# Patient Record
Sex: MALE | URBAN - METROPOLITAN AREA
[De-identification: names, ages, dates, MRNs, and addresses within clinical notes are randomized per-mention and may not be internally consistent; named-entity substitution may affect disease eponyms.]

---

## 2018-09-05 ENCOUNTER — OFFICE VISIT (OUTPATIENT)
Dept: ORTHOPEDIC SURGERY | Age: 38
End: 2018-09-05

## 2018-09-05 VITALS
RESPIRATION RATE: 18 BRPM | BODY MASS INDEX: 27.67 KG/M2 | HEART RATE: 70 BPM | OXYGEN SATURATION: 99 % | DIASTOLIC BLOOD PRESSURE: 83 MMHG | TEMPERATURE: 98.6 F | SYSTOLIC BLOOD PRESSURE: 152 MMHG | WEIGHT: 204 LBS

## 2018-09-05 DIAGNOSIS — M51.36 DDD (DEGENERATIVE DISC DISEASE), LUMBAR: Primary | ICD-10-CM

## 2018-09-05 DIAGNOSIS — R20.0 NUMBNESS OF TOES: ICD-10-CM

## 2018-09-05 NOTE — PROGRESS NOTES
[address], [address]  [place]  Phone: [phone]  Fax: [phone]        Ariel Petersen  : 1980  PCP: None      NEW PATIENT      ASSESSMENT AND PLAN     Diagnoses and all orders for this visit:    1. DDD (degenerative disc disease), lumbar  -     [88049] LS Spine 4V    2. Numbness of toes       1. Advised to stay active as tolerated. 2. Discussed DDD, core exercises, diet, injections, and surgery as last resort  3. If progression of peripheral neuropathy symptoms, would recommend lab work. 4. Given low back exercises    Follow-up Disposition:  Return if symptoms worsen or fail to improve. CHIEF COMPLAINT  Mitchell Recinos is seen today in consultation at the request of [facility] for complaints of back and BLE pain. HISTORY OF PRESENT ILLNESS  Mitchell Recinos is a 34 y.o. male. Today pt c/o back and BLE pain of 2 year duration. Pt denies any specific incident or injury that caused their pain. Pt states he received imaging upon initial pain 2 years ago which showed a cyst in L testicle. He went to urologist 2 years ago who performed surgery to remove the cyst which relieved all his pain. Pt states later his pain in his low back and L sciatica returned. Pt states about a month ago after spending a day painting the house, he noticed his toes tingling L>R. This has continued intermittently. Pt reports new nocturia. Pt states his pain with worse with laying down and is eased by activity during work. He states he usually finds relief while laying by laying curled on his side, but some night he finds no relief. Pt states he is running consistently in addition to being active at work. He however has not returned to yoga due to time constraints. Pt states prolonged periods of sitting elicits back pain. Pt states crossing legs while sitting helps to relieve pain. Pt states his back pain > BLE pain. Pt reports his legs are unequal in length and has relief from an insole. Pt denies issues with BUE or neck. Pt reports seeing chiropractor previously with benefit from neck pain and migraines. Location of pain: low back, daily  Does pain radiate into extremities: LLE to knee, RLE to buttock, toes tingling off and on  Does patient have weakness: no   Pt denies saddle paresthesias. Medications pt is on: Motrin with some benefit. Pt denies recent ED visits or hospitalizations. Denies persistent fevers, chills, weight changes, neurogenic bowel or bladder symptoms. Treatments patient has tried:  Physical therapy:Yes  Non-opioid medications: Yes, avoids meds  Spinal injections: No  Spinal surgery- No.        reviewed. PMHx of R knee surgery. Pt works in aviation. Pt admits family history of sciatica. Pain Assessment  9/5/2018   Location of Pain Back; Toe   Location Modifiers Left;Right   Severity of Pain 2   Quality of Pain (No Data)   Quality of Pain Comment stiffness, soreness, numbness, tingling   Frequency of Pain Constant   Aggravating Factors (No Data)   Aggravating Factors Comment pain is just there   Relieving Factors NSAID       MRI lumbar spine 2016  Impression:  1. L5-S1 small central and left paracentral protrusion with mild lateral recess narrowing. 2. L4-5 broad-based disc herniation and small left foraminal protrusion without significant stenosis. PAST MEDICAL HISTORY   Past Medical History:   Diagnosis Date    Testicular pain        Past Surgical History:   Procedure Laterality Date    HX TONSIL AND ADENOIDECTOMY      KNEE SCOPE, ALLOGRAFT IMPANT         MEDICATIONS    Current Outpatient Prescriptions   Medication Sig Dispense Refill    ibuprofen 200 mg cap Take  by mouth.          ALLERGIES  Allergies   Allergen Reactions    Nasonex [Mometasone] Other (comments)          SOCIAL HISTORY    Social History     Social History    Marital status:      Spouse name: N/A   Annika Anand Number of children: N/A    Years of education: N/A     Occupational History    Not on file. Social History Main Topics    Smoking status: Current Every Day Smoker     Packs/day: 0.50    Smokeless tobacco: Never Used    Alcohol use No    Drug use: Not on file    Sexual activity: Not on file     Other Topics Concern    Not on file     Social History Narrative       FAMILY HISTORY  No family history on file. REVIEW OF SYSTEMS  Review of Systems   Constitutional: Negative for chills, fever and weight loss. Respiratory: Negative for shortness of breath. Cardiovascular: Negative for chest pain. Gastrointestinal: Negative for constipation. Negative for fecal incontinence   Genitourinary: Negative for dysuria. Negative for urinary incontinence   Musculoskeletal:        Per HPI   Skin: Negative for rash. Neurological: Positive for tingling. Negative for dizziness, tremors, focal weakness and headaches. Endo/Heme/Allergies: Does not bruise/bleed easily. Psychiatric/Behavioral: The patient does not have insomnia. PHYSICAL EXAMINATION  Visit Vitals    /83 (BP 1 Location: Left arm, BP Patient Position: Sitting)    Pulse 70    Temp 98.6 °F (37 °C) (Oral)    Resp 18    Wt 204 lb (92.5 kg)    SpO2 99%    BMI 27.67 kg/m2          Accompanied by self. Constitutional:  Well developed, well nourished, in no acute distress. Psychiatric: Affect and mood are appropriate. Integumentary: No rashes or abrasions noted on exposed areas. Cardiovascular/Peripheral Vascular: Intact l pulses. No peripheral edema is noted. Lymphatic:  No evidence of lymphedema. No cervical lymphadenopathy. SPINE/MUSCULOSKELETAL EXAM      Lumbar spine:  No rash, ecchymosis, or gross obliquity. No fasciculations. No focal atrophy is noted. Excellent flexion. Increased pain with extension and lateral bending. Tenderness to palpation L5-S1. No tenderness to palpation at the sciatic notch. SI joints non-tender. Trochanters non tender. Sensation grossly intact to light touch. MOTOR:     Hip Flex Quads Hamstrings Ankle DF EHL Ankle PF   Right 5/5 5/5 5/5 5/5 5/5 5/5   Left 5/5 5/5 5/5 5/5 5/5 5/5        Straight Leg raise negative. No difficulty with tandem gait. Heel walk elicits back pain. Single Toe rise intact. Squat elicits back pain when rising. Ambulation without assistive device. FWB. RADIOGRAPHS  Lumbar spine xray films reviewed:  1) Degenerative changes at L5-S1  2) no instability    Written by Jason Silverio, as dictated by Marco A Valdez MD.    I, Dr. Marco A Valdez MD, confirm that all documentation is accurate. Mr. Mathew Corea may have a reminder for a \"due or due soon\" health maintenance. I have asked that he contact his primary care provider for follow-up on this health maintenance.

## 2018-09-05 NOTE — MR AVS SNAPSHOT
[address] [facility] [address] [place] [address] 
[phone] Patient: Shreya Magallon MRN: [id number] WIE:[id number] Visit Information Date & Time Provider Department Dept. Phone Encounter #  
 9/5/2018  9:30 AM Lisa Cortes MD [facility] [phone] [id number] Follow-up Instructions Return if symptoms worsen or fail to improve. Upcoming Health Maintenance Date Due DTaP/Tdap/Td series (1 - Tdap) 4/29/1999 Influenza Age 5 to Adult 8/1/2018 Allergies as of 9/5/2018  Review Complete On: 9/5/2018 By: Nevaeh Spann Severity Noted Reaction Type Reactions Nasonex [Mometasone]  10/05/2016    Other (comments) Current Immunizations  Never Reviewed No immunizations on file. Not reviewed this visit You Were Diagnosed With   
  
 Codes Comments DDD (degenerative disc disease), lumbar    -  Primary ICD-10-CM: M51.36 
ICD-9-CM: 722.52 Nonspecific pain in the lumbar region     ICD-10-CM: M54.5 ICD-9-CM: 724.2 Vitals BP Pulse Temp Resp Weight(growth percentile) SpO2  
 152/83 (BP 1 Location: Left arm, BP Patient Position: Sitting) 70 98.6 °F (37 °C) (Oral) 18 204 lb (92.5 kg) 99% BMI Smoking Status 27.67 kg/m2 Current Every Day Smoker BMI and BSA Data Body Mass Index Body Surface Area  
 27.67 kg/m 2 2.17 m 2 Preferred Pharmacy Pharmacy Name Phone [phone] Your Updated Medication List  
  
   
This list is accurate as of 9/5/18 10:35 AM.  Always use your most recent med list.  
  
  
  
  
 ibuprofen 200 mg Cap Take  by mouth. We Performed the Following AMB POC XRAY, SPINE, LUMBOSACRAL; 4+ [id number] CPT(R)] Follow-up Instructions Return if symptoms worsen or fail to improve. Patient Instructions Learning About Low Back Pain What is low back pain? Low back pain is pain that can occur anywhere below the ribs and above the legs. It is very common. Almost everyone has it at one time or another. Low back pain can be: 
Acute. This is new pain that can last a few days to a few weeks-at the most a few months. Chronic. This pain can last for more than a few months. Sometimes it can last for years. What are some myths about low back pain? Here are some common myths about low back pain-and the facts: Myth: \"I need to rest my back when I have back pain. \" 
Fact: Staying active won't hurt you. It may help you get better faster. Myth: \"I need prescription pain medicine. \" 
Fact: It's best to try to let time and being active heal your back. Opioid pain medicines-such as hydrocodone or oxycodone-usually don't work any better than over-the-counter medicines like ibuprofen or naproxen. And opioids can cause serious problems like addiction or overdose. Myth: \"I need a test like an X-ray or an MRI to diagnose my low back pain. \" 
Fact: Getting a test right away won't help you get better faster. And it could lead you down a treatment path you may not need, since most people get better on their own. What causes low back pain? In most cases, there isn't a clear cause. This can be frustrating, because your back hurts and there's no obvious reason. Your back pain can be caused by: 
Overuse or muscle strain. This can happen from playing sports, lifting heavy things, or not being physically fit. A herniated disc. This is a problem with the cushion between the bones in your back. Arthritis. With age, you may have changes in your bones that can narrow the space around your nerves. Other causes. In rare cases, the cause is a serious illness like an infection or cancer. But there are usually other symptoms too. What are the symptoms? Your symptoms depend on your body and the cause of your back pain. You may feel: 
· Pain that's sharp or dull. It may be in one small area or over a broad area. But even bad pain doesn't mean that it's caused by something serious. · Leg pain, numbness, or tingling. When a nerve gets squeezed-such as from a disc problem or arthritis-you may have symptoms in your leg or foot. You can even have leg symptoms from a back problem without having any pain in your back. How is low back pain diagnosed? A physical exam is the main way to diagnose low back pain. Your doctor may examine your back, check your nerves by testing your reflexes, and make sure that your muscles are strong. He or she also will ask questions about your back and overall health. Most people don't need any tests right away. Tests often don't show the reason for your pain. If your pain lasts more than 6 weeks or you have symptoms that your doctor is more concerned about, he or she may order tests. These may include an X-ray, a CT scan, or an MRI. In some cases, tests can help your doctor find a cause for your pain, especially for pain in one or both legs. How is low back pain treated? Most acute low back pain gets better on its own within a few weeks, no matter what the cause. Time and doing usual activities are all that most people need to feel better. Using heat or ice and taking over-the-counter pain medicine also can help while your body heals. If you aren't getting better on your own or your pain is very bad, your doctor may recommend: 
· Physical therapy. · Spinal manipulation, such as by a chiropractor. · Acupuncture. · Massage. · Injections of steroid medicine in your back (especially for pain that involves your legs). If you have chronic low back pain, treatment will help you understand and manage your pain. Treatment may include: 
· Staying active. This may include walking or doing back exercises. · Physical therapy. · Medicines. Some of these medicines are also used for other problems, like depression. · Pain management. Your doctor may have you see a pain specialist. 
· Counseling. Having chronic pain can be hard. It may help to talk to someone who can help you cope with your pain. Surgery isn't needed for most people. But it may help some types of low back pain. Follow-up care is a key part of your treatment and safety. Be sure to make and go to all appointments, and call your doctor if you are having problems. It's also a good idea to know your test results and keep a list of the medicines you take. When should you call for help? Call 911 anytime you think you may need emergency care. For example, call if: 
· You can't move a leg at all. Call your doctor now or seek immediate medical care if: 
· You have new or worse symptoms in your legs, belly, or buttocks. Symptoms may include: ¨ Numbness or tingling. ¨ Weakness. ¨ Pain. · You lose bladder or bowel control. Watch closely for changes in your health, and be sure to contact your doctor if: · Along with the back pain, you have a fever, lose weight, or don't feel well. · You do not get better as expected. Where can you learn more? Go to http://madeline-harriet.info/. Enter A007 in the search box to learn more about \"Learning About Low Back Pain. \" Current as of: November 29, 2017 Content Version: 11.7 © 7365-4253 [employer]. Care instructions adapted under license by [employer] (which disclaims liability or warranty for this information). If you have questions about a medical condition or this instruction, always ask your healthcare professional. [place] [address] any warranty or liability for your use of this information. Introducing [facility]!    
 [facility] [employer] introduces [employer] patient portal. Now you can access parts of your medical record, email your doctor's office, and request medication refills online. 1. In your internet browser, go to [email or web address] 2. Click on the First Time User? Click Here link in the Sign In box. You will see the New Member Sign Up page. 3. Enter your [employer] Access Code exactly as it appears below. You will not need to use this code after youve completed the sign-up process. If you do not sign up before the expiration date, you must request a new code. · [employer] Access Code: [email or web address] Expires: 12/4/2018  9:02 AM 
 
4. Enter the last four digits of your Social Security Number (xxxx) and Date of Birth (mm/dd/yyyy) as indicated and click Submit. You will be taken to the next sign-up page. 5. Create a [employer] ID. This will be your [employer] login ID and cannot be changed, so think of one that is secure and easy to remember. 6. Create a [employer] password. You can change your password at any time. 7. Enter your Password Reset Question and Answer. This can be used at a later time if you forget your password. 8. Enter your e-mail address. You will receive e-mail notification when new information is available in [address] 9. Click Sign Up. You can now view and download portions of your medical record. 10. Click the Download Summary menu link to download a portable copy of your medical information. If you have questions, please visit the Frequently Asked Questions section of the [employer] website. Remember, [employer] is NOT to be used for urgent needs. For medical emergencies, dial 911. Now available from your iPhone and Android! Please provide this summary of care documentation to your next provider. Your primary care clinician is listed as NONE. If you have any questions after today's visit, please call [phone].

## 2018-09-05 NOTE — PATIENT INSTRUCTIONS
Learning About Low Back Pain  What is low back pain? Low back pain is pain that can occur anywhere below the ribs and above the legs. It is very common. Almost everyone has it at one time or another. Low back pain can be:  Acute. This is new pain that can last a few days to a few weeks-at the most a few months. Chronic. This pain can last for more than a few months. Sometimes it can last for years. What are some myths about low back pain? Here are some common myths about low back pain-and the facts:  Myth: \"I need to rest my back when I have back pain. \"  Fact: Staying active won't hurt you. It may help you get better faster. Myth: \"I need prescription pain medicine. \"  Fact: It's best to try to let time and being active heal your back. Opioid pain medicines-such as hydrocodone or oxycodone-usually don't work any better than over-the-counter medicines like ibuprofen or naproxen. And opioids can cause serious problems like addiction or overdose. Myth: \"I need a test like an X-ray or an MRI to diagnose my low back pain. \"  Fact: Getting a test right away won't help you get better faster. And it could lead you down a treatment path you may not need, since most people get better on their own. What causes low back pain? In most cases, there isn't a clear cause. This can be frustrating, because your back hurts and there's no obvious reason. Your back pain can be caused by:  Overuse or muscle strain. This can happen from playing sports, lifting heavy things, or not being physically fit. A herniated disc. This is a problem with the cushion between the bones in your back. Arthritis. With age, you may have changes in your bones that can narrow the space around your nerves. Other causes. In rare cases, the cause is a serious illness like an infection or cancer. But there are usually other symptoms too. What are the symptoms? Your symptoms depend on your body and the cause of your back pain.  You may feel:  · Pain that's sharp or dull. It may be in one small area or over a broad area. But even bad pain doesn't mean that it's caused by something serious. · Leg pain, numbness, or tingling. When a nerve gets squeezed-such as from a disc problem or arthritis-you may have symptoms in your leg or foot. You can even have leg symptoms from a back problem without having any pain in your back. How is low back pain diagnosed? A physical exam is the main way to diagnose low back pain. Your doctor may examine your back, check your nerves by testing your reflexes, and make sure that your muscles are strong. He or she also will ask questions about your back and overall health. Most people don't need any tests right away. Tests often don't show the reason for your pain. If your pain lasts more than 6 weeks or you have symptoms that your doctor is more concerned about, he or she may order tests. These may include an X-ray, a CT scan, or an MRI. In some cases, tests can help your doctor find a cause for your pain, especially for pain in one or both legs. How is low back pain treated? Most acute low back pain gets better on its own within a few weeks, no matter what the cause. Time and doing usual activities are all that most people need to feel better. Using heat or ice and taking over-the-counter pain medicine also can help while your body heals. If you aren't getting better on your own or your pain is very bad, your doctor may recommend:  · Physical therapy. · Spinal manipulation, such as by a chiropractor. · Acupuncture. · Massage. · Injections of steroid medicine in your back (especially for pain that involves your legs). If you have chronic low back pain, treatment will help you understand and manage your pain. Treatment may include:  · Staying active. This may include walking or doing back exercises. · Physical therapy. · Medicines.  Some of these medicines are also used for other problems, like depression. · Pain management. Your doctor may have you see a pain specialist.  · Counseling. Having chronic pain can be hard. It may help to talk to someone who can help you cope with your pain. Surgery isn't needed for most people. But it may help some types of low back pain. Follow-up care is a key part of your treatment and safety. Be sure to make and go to all appointments, and call your doctor if you are having problems. It's also a good idea to know your test results and keep a list of the medicines you take. When should you call for help? Call 911 anytime you think you may need emergency care. For example, call if:  · You can't move a leg at all. Call your doctor now or seek immediate medical care if:  · You have new or worse symptoms in your legs, belly, or buttocks. Symptoms may include:  ¨ Numbness or tingling. ¨ Weakness. ¨ Pain. · You lose bladder or bowel control. Watch closely for changes in your health, and be sure to contact your doctor if:  · Along with the back pain, you have a fever, lose weight, or don't feel well. · You do not get better as expected. Where can you learn more? Go to http://madeline-harriet.info/. Enter A007 in the search box to learn more about \"Learning About Low Back Pain. \"  Current as of: November 29, 2017  Content Version: 11.7  © 9664-6747 Healthwise, Incorporated. Care instructions adapted under license by [employer] (which disclaims liability or warranty for this information). If you have questions about a medical condition or this instruction, always ask your healthcare professional. [place] [address] any warranty or liability for your use of this information. Learning About Degenerative Disc Disease  What is degenerative disc disease? Degenerative disc disease is not really a disease. It's a term used to describe the normal changes in your spinal discs as you age.  Spinal discs are small, spongy discs that separate the bones (vertebrae) that make up the spine. The discs act as shock absorbers for the spine, so it can flex, bend, and twist.  Degenerative disc disease can take place in one or more places along the spine. It most often occurs in the discs in the lower back and the neck. What causes it? As we age, our spinal discs break down, or degenerate. This breakdown causes the symptoms of degenerative disc disease in some people. When the discs break down, they can lose fluid and dry out, and their outer layers can have tiny cracks or tears. This leads to less padding and less space between the bones in the spine. The body reacts to this by making bony growths on the spine called bone spurs. These spurs can press on the spinal nerve roots or spinal cord. This can cause pain and can affect how well the nerves work. What are the symptoms? Many people with degenerative disc disease have no pain. But others have severe pain or other symptoms that limit their activities. Some of the most common symptoms are:  · Pain in the back or neck. Where the pain occurs depends on which discs are affected. · Pain that gets worse when you move, such as bending over, reaching up, or twisting. · Pain that may occur in the rear end (buttocks), arm, or leg if a nerve is pinched. · Numbness or tingling in your arm or leg. How is it diagnosed? A doctor can often diagnose degenerative disc disease while doing a physical exam. If your exam shows no signs of a serious condition, imaging tests (such as an X-ray) aren't likely to help your doctor find the cause of your symptoms. Sometimes degenerative disc disease is found when an X-ray is taken for another reason, such as an injury or other health problem. But even if the doctor finds degenerative disc disease, that doesn't always mean that you will have symptoms. How is it treated? There are several things you can do at home to manage pain from this problem.   · To relieve pain, use ice or heat (whichever feels better) on the affected area. ¨ Put ice or a cold pack on the area for 10 to 20 minutes at a time. Put a thin cloth between the ice and your skin. ¨ Put a warm water bottle, a heating pad set on low, or a warm cloth on your back. Put a thin cloth between the heating pad and your skin. Do not go to sleep with a heating pad on your skin. · Ask your doctor if you can take acetaminophen (such as Tylenol) or nonsteroidal anti-inflammatory drugs, such as ibuprofen or naproxen. Your doctor can prescribe stronger medicines if needed. Be safe with medicines. Read and follow all instructions on the label. · Get some exercise every day. Exercise is one of the best ways to help your back feel better and stay better. It's best to start each exercise slowly. You may notice a little soreness, and that's okay. But if an exercise makes your pain worse, stop doing it. Here are things you can try:  ¨ Walking. It's the simplest and maybe the best activity for your back. It gets your blood moving and helps your muscles stay strong. ¨ Exercises that gently stretch and strengthen your stomach, back, and leg muscles. The stronger those muscles are, the better they're able to protect your back. If you have constant or severe pain in your back or spine, you may need other treatments, such as physical therapy. In some cases, your doctor may suggest surgery. Follow-up care is a key part of your treatment and safety. Be sure to make and go to all appointments, and call your doctor if you are having problems. It's also a good idea to know your test results and keep a list of the medicines you take. Where can you learn more? Go to http://madeline-harriet.info/. Enter J161 in the search box to learn more about \"Learning About Degenerative Disc Disease. \"  Current as of: November 29, 2017  Content Version: 11.7  © 8066-3755 [employer].  Care instructions adapted under license by [address] (which disclaims liability or warranty for this information). If you have questions about a medical condition or this instruction, always ask your healthcare professional. [address] any warranty or liability for your use of this information.

## 2020-01-16 ENCOUNTER — OFFICE VISIT (OUTPATIENT)
Dept: ORTHOPEDIC SURGERY | Age: 40
End: 2020-01-16

## 2020-01-16 VITALS
RESPIRATION RATE: 20 BRPM | BODY MASS INDEX: 31.53 KG/M2 | OXYGEN SATURATION: 98 % | WEIGHT: 232.8 LBS | TEMPERATURE: 97.9 F | SYSTOLIC BLOOD PRESSURE: 133 MMHG | DIASTOLIC BLOOD PRESSURE: 70 MMHG | HEIGHT: 72 IN | HEART RATE: 90 BPM

## 2020-01-16 DIAGNOSIS — M54.32 LEFT SIDED SCIATICA: Primary | ICD-10-CM

## 2020-01-16 DIAGNOSIS — M51.36 DDD (DEGENERATIVE DISC DISEASE), LUMBAR: ICD-10-CM

## 2020-01-16 NOTE — PROGRESS NOTES
Shanita Garcia presents today for   Chief Complaint   Patient presents with    LOW BACK PAIN    Buttocks pain    Knee Pain     left    Follow-up       Is someone accompanying this pt? NO    Is the patient using any DME equipment during OV? NO    Depression Screening:  3 most recent PHQ Screens 1/16/2020   Little interest or pleasure in doing things Not at all   Feeling down, depressed, irritable, or hopeless Not at all   Total Score PHQ 2 0       Learning Assessment:  Learning Assessment 1/16/2020   PRIMARY LEARNER Patient   PRIMARY LANGUAGE ENGLISH   LEARNER PREFERENCE PRIMARY DEMONSTRATION   ANSWERED BY patient   RELATIONSHIP SELF       Coordination of Care:  1. Have you been to the ER, urgent care clinic since your last visit? NO  Hospitalized since your last visit? NO    2. Have you seen or consulted any other health care providers outside of the [address] [place] [address] since your last visit? NO Include any pap smears or colon screening.  NO    Last  Checked 1/16/2020

## 2020-01-16 NOTE — PROGRESS NOTES
[facility] [address]  [address], [address]  [facility], [address]  Phone: [phone]  Fax: [phone]        Romie Velásquez  : 1980  PCP: None    PROGRESS NOTE      ASSESSMENT AND PLAN    Diagnoses and all orders for this visit:    1. Left sided sciatica    2. DDD (degenerative disc disease), lumbar    3. Acute pain of left knee       1. Advised to continue HEP. 2. Lumbar MRI, 6 mos increasing low back pain, LLE sciatica  Given information on back exercises    F/U after MRI      HISTORY OF PRESENT ILLNESS  Stevenson Benavides is a 37 y.o. male. Pt presents to the office for a f/u visit for low back pain. He was last seen in 2018 for axial LBP. Today he presents with 6-8 months of left buttock pain radiating down left leg. Pain is worse with sitting. He reports left knee pain as well. Reports physical work as , cumulative trauma likely from lifting, crouching, bending, twisting over the years. Location of pain: back, buttocks  Does pain radiate into extremities: LLE  Does patient have weakness: no   Pt denies saddle paresthesias. Medications pt is on: Ibuprofen 200 mg, no benefit. Denies persistent fevers, chills, weight changes, neurogenic bowel or bladder symptoms. Treatments patient has tried:  Physical therapy:Yes  Doing HEP: Yes, stretches  Non-opioid medications: Yes  Spinal injections: No  Spinal surgery- No.   Last L MRI 2016: Disc protrusion at L4-5, L5-S1      reviewed. Pt is being discharged from the [facility] as an  after 17.5 years. Pain Assessment  2020   Location of Pain Back;Knee;Buttocks   Location Modifiers Superior; Left   Severity of Pain 7   Quality of Pain Sharp   Quality of Pain Comment -   Duration of Pain Persistent   Frequency of Pain Constant   Aggravating Factors -   Aggravating Factors Comment -   Relieving Factors -   Result of Injury No       PAST MEDICAL HISTORY   Past Medical History:   Diagnosis Date    Testicular pain        Past Surgical History:   Procedure Laterality Date    HX TONSIL AND ADENOIDECTOMY      KNEE SCOPE, ALLOGRAFT IMPANT     . MEDICATIONS    Current Outpatient Medications   Medication Sig Dispense Refill    ibuprofen 200 mg cap Take  by mouth. ALLERGIES  Allergies   Allergen Reactions    Nasonex [Mometasone] Other (comments)          SOCIAL HISTORY    Social History     Socioeconomic History    Marital status:      Spouse name: Not on file    Number of children: Not on file    Years of education: Not on file    Highest education level: Not on file   Occupational History    Not on file   Social Needs    Financial resource strain: Not on file    Food insecurity:     Worry: Not on file     Inability: Not on file    Transportation needs:     Medical: Not on file     Non-medical: Not on file   Tobacco Use    Smoking status: Current Every Day Smoker     Packs/day: 0.50    Smokeless tobacco: Never Used   Substance and Sexual Activity    Alcohol use: No    Drug use: Not on file    Sexual activity: Not on file   Lifestyle    Physical activity:     Days per week: Not on file     Minutes per session: Not on file    Stress: Not on file   Relationships    Social connections:     Talks on phone: Not on file     Gets together: Not on file     Attends Rastafari service: Not on file     Active member of club or organization: Not on file     Attends meetings of clubs or organizations: Not on file     Relationship status: Not on file    Intimate partner violence:     Fear of current or ex partner: Not on file     Emotionally abused: Not on file     Physically abused: Not on file     Forced sexual activity: Not on file   Other Topics Concern    Not on file   Social History Narrative    Not on file       FAMILY HISTORY  No family history on file.     REVIEW OF SYSTEMS  Review of Systems   Constitutional: Negative for chills, fever and weight loss. Respiratory: Negative for shortness of breath. Cardiovascular: Negative for chest pain. Gastrointestinal: Negative for constipation. Negative for fecal incontinence   Genitourinary: Negative for dysuria. Negative for urinary incontinence   Musculoskeletal:        Per HPI   Skin: Negative for rash. Neurological: Positive for sensory change. Negative for dizziness, tingling, tremors, focal weakness and headaches. Endo/Heme/Allergies: Does not bruise/bleed easily. Psychiatric/Behavioral: The patient does not have insomnia. PHYSICAL EXAMINATION  Visit Vitals  /70   Pulse 90   Temp 97.9 °F (36.6 °C) (Oral)   Resp 20   Ht 6' (1.829 m)   Wt 232 lb 12.8 oz (105.6 kg)   SpO2 98%   BMI 31.57 kg/m²         Accompanied by self. Constitutional:  Well developed, well nourished, in no acute distress. Psychiatric: Affect and mood are appropriate. Integumentary: No rashes or abrasions noted on exposed areas. Cardiovascular/Peripheral Vascular: No peripheral edema is noted BLE. SPINE/MUSCULOSKELETAL EXAM    Lumbar spine:  No rash, ecchymosis, or gross obliquity. No fasciculations. No focal atrophy is noted. Tenderness to palpation midline to the L/S juntion. Mild tenderness at the left trochanteric bursa. No tenderness to palpation at the sciatic notch. SI joints non-tender. Lumbar ROM: DP is flexion. MOTOR:       Hip Flex  Quads Hamstrings Ankle DF EHL Ankle PF   Right 5/5 5/5 5/5 5/5 5/5 5/5   Left 5/5 5/5 5/5 5/5 5/5 5/5     Tender at the lateral knee on the left. Straight Leg raise negative. Mild difficulty with tandem gait. Left knee pain with heel walk. Intact Toe rise. Ambulation without assistive device. FWB. Written by Debi Dumont, as dictated by Ugo Hahn MD.    I, Dr. Ugo Hahn MD, confirm that all documentation is accurate.       Mr. Joeann Schlatter may have a reminder for a \"due or due soon\" health maintenance. I have asked that he contact his primary care provider for follow-up on this health maintenance.

## 2020-01-16 NOTE — PROGRESS NOTES
Verbal order entered per Dr. Jorge Dye as documented on blue sheet:MRI L-spine due to 6mos increased LBP. Left sciatica. Failed NSAIDs/HEP.

## 2020-02-19 ENCOUNTER — OFFICE VISIT (OUTPATIENT)
Dept: ORTHOPEDIC SURGERY | Age: 40
End: 2020-02-19

## 2020-02-19 VITALS
HEART RATE: 89 BPM | BODY MASS INDEX: 31.15 KG/M2 | OXYGEN SATURATION: 97 % | TEMPERATURE: 97.9 F | DIASTOLIC BLOOD PRESSURE: 85 MMHG | HEIGHT: 72 IN | SYSTOLIC BLOOD PRESSURE: 140 MMHG | WEIGHT: 230 LBS | RESPIRATION RATE: 20 BRPM

## 2020-02-19 DIAGNOSIS — M25.562 LEFT KNEE PAIN, UNSPECIFIED CHRONICITY: ICD-10-CM

## 2020-02-19 DIAGNOSIS — M51.26 PROTRUDED LUMBAR DISC: Primary | ICD-10-CM

## 2020-02-19 DIAGNOSIS — M54.32 LEFT SIDED SCIATICA: ICD-10-CM

## 2020-02-19 RX ORDER — CYCLOBENZAPRINE HCL 10 MG
10 TABLET ORAL
COMMUNITY
End: 2022-02-16

## 2020-02-19 RX ORDER — GABAPENTIN 300 MG/1
300 CAPSULE ORAL
Qty: 30 CAP | Refills: 2 | Status: SHIPPED | OUTPATIENT
Start: 2020-02-19 | End: 2020-03-20

## 2020-02-19 NOTE — PROGRESS NOTES
[facility] [address].  [address], [address]  [place], [address] [place] [address] Street  Phone: [phone]  Fax: [phone]        Clairsa Landon  : 1980  PCP: None    PROGRESS NOTE      ASSESSMENT AND PLAN    Diagnoses and all orders for this visit:    1. Protruded lumbar disc  -     gabapentin (NEURONTIN) 300 mg capsule; Take 1 Cap by mouth nightly for 30 days. Max Daily Amount: 300 mg.    2. Left sided sciatica  -     gabapentin (NEURONTIN) 300 mg capsule; Take 1 Cap by mouth nightly for 30 days. Max Daily Amount: 300 mg.    3. Left knee pain, unspecified chronicity       1. Advised to continue HEP. 2. Prioritize knee treatment  3. Trial of Gabapentin  4. May DC Flexeril  5. Discussed life style modification, PT, medication, spinal injection, and surgery as treatment options   6. Be careful on stairs. Avoid repetitive bending, lifting, and twisting   7. Given information on low back exercises    F/U 3 months after tx for knee      HISTORY OF PRESENT ILLNESS  Pepe Noland is a 37 y.o. male. Last visit pt was sent to have a lumbar spine spine MRI. Images reviewed with the pt. Pt notes last night was a bad night. He admits he bent over while blow drying his daughter's hair which aggravated his pain. He notes his R buttock hurts today as well as LLE. Pt states he has had an MRI of his L knee which revealed messed up meniscus. Has PT upcoming for that. Location of pain: low back  Does pain radiate into extremities: B/L buttock, LLE. L knee pain - meniscus issues  Does patient have weakness: no   Pt denies saddle paresthesias. Medications pt is on: Flexeril 10mg PRN. Ibuprofen 800mg PRN. Denies persistent fevers, chills, weight changes, neurogenic bowel or bladder symptoms. Pt denies recent ED visits or hospitalizations.      Treatments patient has tried:  Physical therapy:Yes low back 2019  Doing HEP: Yes, stretches  Non-opioid medications: Yes   Spinal injections: No  Spinal surgery- No.   Last L MRI 2020: disc protrusion L L3-4, midline L5-S1. Multilevel FA.  reviewed. Pt is working at the [employer] as an . Been there 17.5 years. 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 Pain Assessment  2/19/2020   Location of Pain Back   Location Modifiers Medial   Severity of Pain 2   Quality of Pain Dull   Quality of Pain Comment -   Duration of Pain Persistent   Frequency of Pain Constant   Aggravating Factors Standing;Walking   Aggravating Factors Comment sitting too long   Limiting Behavior Yes   Relieving Factors Other (Comment)   Relieving Factors Comment try to lay down   Result of Injury No       MRI lumbar spine 2020  Preliminary Report: disc protrusion L L3-4  Full report scanned in chart      PAST MEDICAL HISTORY   Past Medical History:   Diagnosis Date    Testicular pain        Past Surgical History:   Procedure Laterality Date    HX TONSIL AND ADENOIDECTOMY      KNEE SCOPE, ALLOGRAFT IMPANT Right 2009, about   . MEDICATIONS      Current Outpatient Medications   Medication Sig Dispense Refill    gabapentin (NEURONTIN) 300 mg capsule Take 1 Cap by mouth nightly for 30 days. Max Daily Amount: 300 mg. 30 Cap 2    cyclobenzaprine (FLEXERIL) 10 mg tablet Take 10 mg by mouth nightly as needed for Muscle Spasm(s).  ibuprofen 200 mg cap Take  by mouth.           ALLERGIES    Allergies   Allergen Reactions    Nasonex [Mometasone] Other (comments)          SOCIAL HISTORY    Social History     Socioeconomic History    Marital status:      Spouse name: Not on file    Number of children: Not on file    Years of education: Not on file    Highest education level: Not on file   Occupational History    Not on file   Social Needs    Financial resource strain: Not on file    Food insecurity:     Worry: Not on file     Inability: Not on file    Transportation needs:     Medical: Not on file     Non-medical: Not on file   Tobacco Use    Smoking status: Current Every Day Smoker     Packs/day: 0.50    Smokeless tobacco: Never Used   Substance and Sexual Activity    Alcohol use: No    Drug use: Not on file    Sexual activity: Not on file   Lifestyle    Physical activity:     Days per week: Not on file     Minutes per session: Not on file    Stress: Not on file   Relationships    Social connections:     Talks on phone: Not on file     Gets together: Not on file     Attends Caodaism service: Not on file     Active member of club or organization: Not on file     Attends meetings of clubs or organizations: Not on file     Relationship status: Not on file    Intimate partner violence:     Fear of current or ex partner: Not on file     Emotionally abused: Not on file     Physically abused: Not on file     Forced sexual activity: Not on file   Other Topics Concern    Not on file   Social History Narrative    Not on file       FAMILY HISTORY  No family history on file. REVIEW OF SYSTEMS  Review of Systems   Constitutional: Negative for chills, fever and weight loss. Respiratory: Negative for shortness of breath. Cardiovascular: Negative for chest pain. Gastrointestinal: Negative for constipation. Negative for fecal incontinence   Genitourinary: Negative for dysuria. Negative for urinary incontinence   Musculoskeletal: Positive for joint pain. Per HPI   Skin: Negative for rash. Neurological: Negative for dizziness, tingling, tremors, focal weakness and headaches. Endo/Heme/Allergies: Does not bruise/bleed easily. Psychiatric/Behavioral: The patient does not have insomnia. PHYSICAL EXAMINATION  Visit Vitals  /85 (BP 1 Location: Left arm, BP Patient Position: Sitting)   Pulse 89   Temp 97.9 °F (36.6 °C) (Oral)   Resp 20   Ht 6' (1.829 m)   Wt 230 lb (104.3 kg)   SpO2 97% Comment: RA   BMI 31.19 kg/m²         Accompanied by self.       Constitutional:  Well developed, well nourished, in no acute distress. Psychiatric: Affect and mood are appropriate. Integumentary: No rashes or abrasions noted on exposed areas. Cardiovascular/Peripheral Vascular: No peripheral edema is noted BLE. SPINE/MUSCULOSKELETAL EXAM      Lumbar spine:  No rash, ecchymosis, or gross obliquity. No fasciculations. No focal atrophy is noted. Tenderness to palpation L4-5. No tenderness to palpation at the sciatic notch. SI joints non-tender. Trochanters non tender. MOTOR:       Hip Flex  Quads Hamstrings Ankle DF EHL Ankle PF   Right +4/5 +4/5 +4/5 +4/5 +4/5 +4/5   Left +4/5 +4/5 +4/5 +4/5 +4/5 +4/5     Mild eversion weakness L 4/5    Straight Leg raise negative. Ambulation without assistive device. FWB. Written by Charan Palumbo, as dictated by Ken Leon MD.    I, Dr. Ken Leon MD, confirm that all documentation is accurate. Mr. Tayla Godinez may have a reminder for a \"due or due soon\" health maintenance. I have asked that he contact his primary care provider for follow-up on this health maintenance.

## 2020-02-19 NOTE — PATIENT INSTRUCTIONS

## 2020-02-19 NOTE — PROGRESS NOTES
Daríodrake Romano presents today for   Chief Complaint   Patient presents with    Back Pain       Is someone accompanying this pt? no    Is the patient using any DME equipment during OV? no    Depression Screening:  3 most recent PHQ Screens 1/16/2020   Little interest or pleasure in doing things Not at all   Feeling down, depressed, irritable, or hopeless Not at all   Total Score PHQ 2 0       Learning Assessment:  Learning Assessment 1/16/2020   PRIMARY LEARNER Patient   PRIMARY LANGUAGE ENGLISH   LEARNER PREFERENCE PRIMARY DEMONSTRATION   ANSWERED BY patient   RELATIONSHIP SELF       Coordination of Care:  1. Have you been to the ER, urgent care clinic since your last visit? no  Hospitalized since your last visit? no    2. Have you seen or consulted any other health care providers outside of the [address] [place] [address] since your last visit? Yes, pcp Include any pap smears or colon screening.  none    Last  Checked 02/19/2020

## 2020-03-05 DIAGNOSIS — M54.32 LEFT SIDED SCIATICA: ICD-10-CM

## 2020-03-05 DIAGNOSIS — M51.36 DDD (DEGENERATIVE DISC DISEASE), LUMBAR: ICD-10-CM

## 2020-04-02 ENCOUNTER — VIRTUAL VISIT (OUTPATIENT)
Dept: ORTHOPEDIC SURGERY | Age: 40
End: 2020-04-02

## 2020-04-02 DIAGNOSIS — M51.36 DDD (DEGENERATIVE DISC DISEASE), LUMBAR: Primary | ICD-10-CM

## 2020-04-02 RX ORDER — GABAPENTIN 300 MG/1
300 CAPSULE ORAL
COMMUNITY
Start: 2020-02-19 | End: 2022-02-16 | Stop reason: SDUPTHER

## 2020-04-02 NOTE — PROGRESS NOTES
David Farr is a 37 y.o. male evaluated via telephone on 4/2/2020. Consent:  He and/or health care decision maker is aware that that he may receive a bill for this telephone service, depending on his insurance coverage, and has provided verbal consent to proceed: Yes      Documentation:  I communicated with the patient and/or health care decision maker about follow-up low back and left leg pain. .   Details of this discussion including any medical advice provided: Patient reports that his left sciatic symptoms are improved with gabapentin. He underwent left knee surgery for torn meniscus on March 1 without any complication. He is currently in physical therapy. He is concerned that a few weeks ago he started to notice more pain in his back with standing bending and prolonged sitting. He denies any fevers chills bowel or bladder changes. He is avoiding NSAIDs due to the virus. I reviewed his MRI [facility] from January 2020. In addition to the left-sided foraminal protrusion at L3-L4. He does have some desiccation of his disks at the lower lumbar levels. I have encouraged him to continue with physical therapy focusing on core. He is to take as needed Tylenol as needed. We have discussed going to every other night gabapentin as he finishes his physical therapy. He is going to call back for an update when he is towards the end of physical therapy. I affirm this is a Patient Initiated Episode with an Established Patient who has not had a related appointment within my department in the past 7 days or scheduled within the next 24 hours.     Total Time: minutes: 11-20 minutes 933am to 944am    Note: not billable if this call serves to triage the patient into an appointment for the relevant concern      Roxianne Cockayne, MD

## 2021-10-26 NOTE — PROGRESS NOTES
Verbal order entered per Dr. Vikram Levi as documented on blue sheet: neurontin 300 mg. Take one capsule by mouth every night at bedtime. #30 caps with 2 refills. [Time Spent: ___ minutes] : I have spent [unfilled] minutes of time on the encounter.

## 2022-02-16 ENCOUNTER — VIRTUAL VISIT (OUTPATIENT)
Dept: ORTHOPEDIC SURGERY | Age: 42
End: 2022-02-16

## 2022-02-16 DIAGNOSIS — M54.32 LEFT SIDED SCIATICA: ICD-10-CM

## 2022-02-16 DIAGNOSIS — M51.36 DDD (DEGENERATIVE DISC DISEASE), LUMBAR: Primary | ICD-10-CM

## 2022-02-16 PROCEDURE — 99442 PR PHYS/QHP TELEPHONE EVALUATION 11-20 MIN: CPT | Performed by: PHYSICAL MEDICINE & REHABILITATION

## 2022-02-16 RX ORDER — BUPROPION HYDROCHLORIDE 300 MG/1
1 TABLET ORAL DAILY
COMMUNITY
Start: 2022-01-29

## 2022-02-16 RX ORDER — GABAPENTIN 300 MG/1
300 CAPSULE ORAL
Qty: 30 CAPSULE | Refills: 1 | Status: SHIPPED | OUTPATIENT
Start: 2022-02-16

## 2022-02-16 NOTE — PROGRESS NOTES
[address], [address]  [place], [address] [place] [address] Street  Phone: [phone]  Fax: [phone]      Kory Villatoro is a 35 y.o. male evaluated via patient initiated telephone call on 2/16/2022. Diagnoses and all orders for this visit:    1. DDD (degenerative disc disease), lumbar  -     gabapentin (NEURONTIN) 300 mg capsule; Take 1 Capsule by mouth nightly as needed for Pain. Max Daily Amount: 300 mg.    2. Left sided sciatica  -     gabapentin (NEURONTIN) 300 mg capsule; Take 1 Capsule by mouth nightly as needed for Pain. Max Daily Amount: 300 mg.         3 35year-old with history of chronic discogenic low back pain, recent recurrent sciatica. 2. RF Gabapentin 300 mg QHS PRN  3. Advised to continue HEP as tolerated. 4. Continue Ibuprofen and Tylenol PRN    Follow-up and Dispositions    · Return if symptoms worsen or fail to improve. HISTORY OF PRESENT ILLNESS  Kory Villatoro is a 35 y.o. male. Pt presents to the office for a f/u visit for of back pain. Last evaluation 04/2020    He reports that his pain has remained unchanged until a couple months ago. His back pain now radiates into his left buttock into his posterior thigh. His pain is exacerbated with sitting. He notes increased stiffness in his back. Patient states he has to do extensive stretching before performing any athletic activity. He has difficulty sleeping at night due to pain. He was taking Gabapentin 300 mg QHS but cannot recall its effectiveness or side effects. Patient is currently taking Tylenol PRN and Ibuprofen PRN with benefit. He is compliant with his HEP.     Pain Assessment  2/16/2022   Location of Pain Back   Pain Location Comment left leg, buttocks   Location Modifiers -   Severity of Pain 5   Quality of Pain Dull;Aching   Quality of Pain Comment -   Duration of Pain Persistent   Frequency of Pain Intermittent   Aggravating Factors Bending   Aggravating Factors Comment standing too long   Limiting Behavior Yes   Relieving Factors (No Data)   Relieving Factors Comment relax, do some stretches   Result of Injury -       Treatments patient has tried:  Physical therapy:Yes low back 2019  Doing HEP: Yes, stretches  Beneficial medications: Tylenol, Ibuprofen, Gabapentin  Failed medications: unknown  Spinal injections: No  Spinal surgery- No.   Last L MRI 2020: disc protrusion L L3-4, midline L5-S1. Multilevel FA.      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 Total Time: minutes: 11-20 minutes    Note: not billable if this call serves to triage the patient into an appointment for the relevant concern    Portions of this document were created using Dragon voice recognition software. Unintended errors reji be present.   Albertina Estrada MD       Written by Maricarmen Knowles, as dictated by Estevan Beyer MD.

## 2022-02-16 NOTE — PROGRESS NOTES
Nabeel Nguyenradha presents today for   Chief Complaint   Patient presents with    Back Pain    Leg Pain     left    Buttocks pain     left       Is someone accompanying this pt? no    Is the patient using any DME equipment during OV? no    Depression Screening:  3 most recent PHQ Screens 1/16/2020   Little interest or pleasure in doing things Not at all   Feeling down, depressed, irritable, or hopeless Not at all   Total Score PHQ 2 0       Learning Assessment:  Learning Assessment 1/16/2020   PRIMARY LEARNER Patient   PRIMARY LANGUAGE ENGLISH   LEARNER PREFERENCE PRIMARY DEMONSTRATION   ANSWERED BY patient   RELATIONSHIP SELF       Coordination of Care:  1. Have you been to the ER, urgent care clinic since your last visit? no  Hospitalized since your last visit? no    2. Have you seen or consulted any other health care providers outside of the [address] [place] [address] since your last visit? Yes, arthritis Include any pap smears or colon screening.  no